# Patient Record
Sex: MALE | Race: WHITE | Employment: UNEMPLOYED | URBAN - METROPOLITAN AREA
[De-identification: names, ages, dates, MRNs, and addresses within clinical notes are randomized per-mention and may not be internally consistent; named-entity substitution may affect disease eponyms.]

---

## 2017-02-17 ENCOUNTER — HOSPITAL ENCOUNTER (OUTPATIENT)
Dept: GENERAL RADIOLOGY | Age: 57
Discharge: HOME | End: 2017-02-17
Attending: ORTHOPAEDIC SURGERY
Payer: OTHER MISCELLANEOUS

## 2017-02-17 ENCOUNTER — HOSPITAL ENCOUNTER (OUTPATIENT)
Dept: CT IMAGING | Age: 57
Discharge: HOME | End: 2017-02-17
Attending: ORTHOPAEDIC SURGERY
Payer: OTHER MISCELLANEOUS

## 2017-02-17 VITALS
RESPIRATION RATE: 16 BRPM | SYSTOLIC BLOOD PRESSURE: 158 MMHG | DIASTOLIC BLOOD PRESSURE: 112 MMHG | HEART RATE: 75 BPM | OXYGEN SATURATION: 95 %

## 2017-02-17 DIAGNOSIS — M48.02 SPINAL STENOSIS IN CERVICAL REGION: ICD-10-CM

## 2017-02-17 DIAGNOSIS — M43.02 SPONDYLOLYSIS OF CERVICAL REGION: ICD-10-CM

## 2017-02-17 PROCEDURE — 77030018868 HC TY MYELGRM BD -A

## 2017-02-17 PROCEDURE — 62302 MYELOGRAPHY LUMBAR INJECTION: CPT

## 2017-02-17 PROCEDURE — 77030003666 HC NDL SPINAL BD -A

## 2017-02-17 PROCEDURE — 72126 CT NECK SPINE W/DYE: CPT

## 2017-02-17 PROCEDURE — 74011636320 HC RX REV CODE- 636/320: Performed by: RADIOLOGY

## 2017-02-17 RX ORDER — LIDOCAINE HYDROCHLORIDE 10 MG/ML
10 INJECTION INFILTRATION; PERINEURAL
Status: DISPENSED | OUTPATIENT
Start: 2017-02-17 | End: 2017-02-17

## 2017-02-17 RX ADMIN — IOHEXOL 20 ML: 240 INJECTION, SOLUTION INTRATHECAL; INTRAVASCULAR; INTRAVENOUS; ORAL at 09:26

## 2017-02-17 NOTE — DISCHARGE INSTRUCTIONS
Myelogram: About This Test  What is it? A myelogram uses X-rays and a special dye to make pictures of bones and nerves of the spine (spinal canal). The spinal canal holds the spinal cord, the spinal nerve roots, and the fluid-filled space between the bones in your spine. Why is this test done? A myelogram is done to check for:  · The cause of arm or leg numbness, weakness, or pain. · Narrowing of the spinal canal (spinal stenosis). · A tumor or infection causing problems with the spinal cord or nerve roots. · A spinal disc that has ruptured (herniated disc). · Inflammation of the membrane that covers the brain and spinal cord. · Problems with the blood vessels to the spine. How can you prepare for the test?  Your doctor will tell you if you need to change how much you eat and drink before the myelogram. You may be asked to increase the amount of water you drink before the test. Follow the instructions your doctor gives you about eating and drinking. Before a myelogram, tell your doctor if:  · You are allergic to any medicines, contrast material, or iodine dye. · You have had bleeding problems, or you take a blood thinner, such as aspirin, clopidogrel (Plavix), or warfarin (Coumadin), or you take over-the-counter medicines, such as ibuprofen (Advil, Motrin) or naproxen (Aleve). Your doctor will tell you when you should stop taking these medicines several days before your procedure. Make sure that you understand exactly what he or she wants you to do. · You have had kidney problems. · You have diabetes, especially if you take metformin (Glucophage, for example). · You are or might be pregnant. Ask someone to take you home and stay with you after the test.  What happens during the test?  The dye is put into your spinal canal with a thin needle. This is called a lumbar puncture. The dye moves through the space so the nerve roots and spinal cord can be seen more clearly.  After the dye is put in, you will lie still while the X-ray pictures are taken. How does it feel? You will feel a quick sting from a small needle that has medicine to numb the skin on your back. You will also feel some pressure as the long, thin spinal needle is put into your spinal canal. You may feel a quick sharp pain down your buttock or leg when the needle is moved in your spine. The dye may make you feel warm and flushed and have a metallic taste in your mouth. What else should you know about the test?  In rare cases, the hole made by the needle in the sac around the spine does not close normally. This can allow spinal fluid to leak out. This leak may need to be repaired through a procedure called an epidural blood patch. To do the patch, your doctor injects some of your own blood to cover the hole. How long does the test take? · A myelogram usually takes 30 minutes to 1 hour. What happens after the test?  · You will probably be able to go home 30 minutes to 2 hours after the test.  · You may need to lie in bed with your head raised for 4 to 24 hours after the test. To prevent seizures, which are a rare side effect, do not bend over or lie down with your head lower than your body. Keeping your head higher than your body after a myelogram also may help prevent or reduce other side effects, such as headache, nausea, or vomiting. · Avoid strenuous activity, such as running or heavy lifting, for at least 1 day after your myelogram.  · Drink plenty of water after the myelogram. Your doctor will give you instructions on taking your regular medicines. When should you call for help? Call 911 anytime you think you may need emergency care. For example, call if:  · You have a seizure. Call your doctor now or seek immediate medical care if:  · You have any increase in pain, weakness, or numbness in your legs. · You have a severe headache or stiff neck, or your eyes become very sensitive to light.   · You have a headache that lasts longer than 24 hours. · You have problems urinating or having a bowel movement. · You have a fever. Follow-up care is a key part of your treatment and safety. Be sure to make and go to all appointments, and call your doctor if you are having problems. It's also a good idea to keep a list of the medicines you take. Ask your doctor when you can expect to have your test results. Where can you learn more? Go to http://charlie-nikhil.info/. Enter S035 in the search box to learn more about \"Myelogram: About This Test.\"  Current as of: August 15, 2016  Content Version: 11.1  © 4147-5594 [employer], [employer]. Care instructions adapted under license by [employer] (which disclaims liability or warranty for this information). If you have questions about a medical condition or this instruction, always ask your healthcare professional. [address] any warranty or liability for your use of this information.

## 2017-02-17 NOTE — PROGRESS NOTES
Pt rec'd to EEG Room/Rad holding post myelogram.  Bandaid intact to lower mid back with s/s drainage. Pt is alert and oriented x3 and c/o 6/10 neck pain which he says is ongoing and chronic. He states he takes ibuprofen for pain with good effect at home. Pt's BP noted to be elevated and pt states it is always high when he is in the hospital or doctors office. Reviewed discharge instructions with patient and wife, printed copy given to them. Pt given disk of procedure. Discharged ambulatory to vehicle to care of wife.

## 2017-02-17 NOTE — IP AVS SNAPSHOT
[facility] 
 
 
 [address] [place] [address] 
[phone] Patient: Chris Melton MRN: [id number] You are allergic to the following Allergen Reactions Prednisone Other (comments) About your hospitalization You were admitted on:  February 17, 2017 You last received care in the:  [facility] RADIOLOGY You were discharged on:  February 17, 2017 Unit phone number:  [phone] Why you were hospitalized Your primary diagnosis was:  Not on File Providers Seen During Your Hospitalizations Provider Role Specialty Primary office phone Brayan Medel MD Attending Provider Orthopedic Surgery [phone] Your Primary Care Physician (PCP) Primary Care Physician Office Phone Office Fax Shade Duverney [phone] Follow-up Information None Your Appointments Friday February 17, 2017  9:30 AM EST  
CT SPINE CERV W CONT with [facility] CT 2 SFM RAD CT ([address]) [facility] [place] [address]  
[phone] CONTRAST STUDY: 1. The patient should not eat solid food four hours before the appointment but should be encouraged to drink clear liquids. 2. The patient will require IV access for contrast administration. 3. The patient should not take  Ibuprofen (Advil, Motrin, etc.) and Naproxen Sodium (Aleve, etc.)  on the day of the exam. Stopping non-steroidal anti-inflammatory agents (NSAIDs) like Ibuprofen decreases the risk of kidney damage from the x-ray contrast (dye). 4. Bring any non [facility] facility films/images pertaining to the area of interest with you on the day of appointment. 5. Bring current lab work if available(within last 90 days [facility]) ***If scheduled at [facility] is not available, labs will need to be done before appointment*** 6.  Check in at registration at least 30 minutes before appt time unless you were instructed to do otherwise. 7. If you have to drink oral contrast please pick it up any weekday prior to your appointment, if you cannot please check in 2 hrs  before appt time. Park in designated visitor/patient parking. Enter through the main entrance, which is just to the left of the fountain. Once inside, go around the corner to the left. You will register in Outpatient Registration. Current Discharge Medication List  
  
Notice You have not been prescribed any medications. Discharge Instructions Myelogram: About This Test 
What is it? A myelogram uses X-rays and a special dye to make pictures of bones and nerves of the spine (spinal canal). The spinal canal holds the spinal cord, the spinal nerve roots, and the fluid-filled space between the bones in your spine. Why is this test done? A myelogram is done to check for: · The cause of arm or leg numbness, weakness, or pain. · Narrowing of the spinal canal (spinal stenosis). · A tumor or infection causing problems with the spinal cord or nerve roots. · A spinal disc that has ruptured (herniated disc). · Inflammation of the membrane that covers the brain and spinal cord. · Problems with the blood vessels to the spine. How can you prepare for the test? 
Your doctor will tell you if you need to change how much you eat and drink before the myelogram. You may be asked to increase the amount of water you drink before the test. Follow the instructions your doctor gives you about eating and drinking. Before a myelogram, tell your doctor if: 
· You are allergic to any medicines, contrast material, or iodine dye.  
· You have had bleeding problems, or you take a blood thinner, such as aspirin, clopidogrel (Plavix), or warfarin (Coumadin), or you take over-the-counter medicines, such as ibuprofen (Advil, Motrin) or naproxen (Aleve). Your doctor will tell you when you should stop taking these medicines several days before your procedure. Make sure that you understand exactly what he or she wants you to do. · You have had kidney problems. · You have diabetes, especially if you take metformin (Glucophage, for example). · You are or might be pregnant. Ask someone to take you home and stay with you after the test. 
What happens during the test? 
The dye is put into your spinal canal with a thin needle. This is called a lumbar puncture. The dye moves through the space so the nerve roots and spinal cord can be seen more clearly. After the dye is put in, you will lie still while the X-ray pictures are taken. How does it feel? You will feel a quick sting from a small needle that has medicine to numb the skin on your back. You will also feel some pressure as the long, thin spinal needle is put into your spinal canal. You may feel a quick sharp pain down your buttock or leg when the needle is moved in your spine. The dye may make you feel warm and flushed and have a metallic taste in your mouth. What else should you know about the test? 
In rare cases, the hole made by the needle in the sac around the spine does not close normally. This can allow spinal fluid to leak out. This leak may need to be repaired through a procedure called an epidural blood patch. To do the patch, your doctor injects some of your own blood to cover the hole. How long does the test take? · A myelogram usually takes 30 minutes to 1 hour. What happens after the test? 
· You will probably be able to go home 30 minutes to 2 hours after the test. 
· You may need to lie in bed with your head raised for 4 to 24 hours after the test. To prevent seizures, which are a rare side effect, do not bend over or lie down with your head lower than your body.  Keeping your head higher than your body after a myelogram also may help prevent or reduce other side effects, such as headache, nausea, or vomiting. · Avoid strenuous activity, such as running or heavy lifting, for at least 1 day after your myelogram. 
· Drink plenty of water after the myelogram. Your doctor will give you instructions on taking your regular medicines. When should you call for help? Call 911 anytime you think you may need emergency care. For example, call if: 
· You have a seizure. Call your doctor now or seek immediate medical care if: 
· You have any increase in pain, weakness, or numbness in your legs. · You have a severe headache or stiff neck, or your eyes become very sensitive to light. · You have a headache that lasts longer than 24 hours. · You have problems urinating or having a bowel movement. · You have a fever. Follow-up care is a key part of your treatment and safety. Be sure to make and go to all appointments, and call your doctor if you are having problems. It's also a good idea to keep a list of the medicines you take. Ask your doctor when you can expect to have your test results. Where can you learn more? Go to http://charlie-nikhil.info/. Enter K244 in the search box to learn more about \"Myelogram: About This Test.\" Current as of: August 15, 2016 Content Version: 11.1 © 5990-5052 [employer]. Care instructions adapted under license by [employer] (which disclaims liability or warranty for this information). If you have questions about a medical condition or this instruction, always ask your healthcare professional. [place] [address] any warranty or liability for your use of this information. Discharge Orders None Introducing [facility]! Jo Ann Whipple introduces Plug.dj patient portal. Now you can access parts of your medical record, email your doctor's office, and request medication refills online. 1. In your internet browser, go to [email or web address] 2. Click on the First Time User? Click Here link in the Sign In box. You will see the New Member Sign Up page. 3. Enter your [employer] Access Code exactly as it appears below. You will not need to use this code after youve completed the sign-up process. If you do not sign up before the expiration date, you must request a new code. · [employer] Access Code: [email or web address] Expires: 5/11/2017  1:31 PM 
 
4. Enter the last four digits of your Social Security Number (xxxx) and Date of Birth (mm/dd/yyyy) as indicated and click Submit. You will be taken to the next sign-up page. 5. Create a D[employer] ID. This will be your [employer] login ID and cannot be changed, so think of one that is secure and easy to remember. 6. Create a [employer] password. You can change your password at any time. 7. Enter your Password Reset Question and Answer. This can be used at a later time if you forget your password. 8. Enter your e-mail address. You will receive e-mail notification when new information is available in [address] 9. Click Sign Up. You can now view and download portions of your medical record. 10. Click the Download Summary menu link to download a portable copy of your medical information. If you have questions, please visit the Frequently Asked Questions section of the [employer] website. Remember, [employer] is NOT to be used for urgent needs. For medical emergencies, dial 911. Now available from your iPhone and Android! General Information Please provide this summary of care documentation to your next provider. Patient Signature:  ____________________________________________________________ Date:  ____________________________________________________________  
  
Stuart Mais Provider Signature:  ____________________________________________________________ Date:  ____________________________________________________________

## 2017-02-21 ENCOUNTER — HOSPITAL ENCOUNTER (EMERGENCY)
Age: 57
Discharge: HOME | End: 2017-02-21
Attending: EMERGENCY MEDICINE
Payer: COMMERCIAL

## 2017-02-21 ENCOUNTER — APPOINTMENT (OUTPATIENT)
Dept: CT IMAGING | Age: 57
End: 2017-02-21
Attending: NURSE PRACTITIONER
Payer: COMMERCIAL

## 2017-02-21 VITALS
HEART RATE: 74 BPM | DIASTOLIC BLOOD PRESSURE: 108 MMHG | HEIGHT: 67 IN | WEIGHT: 160 LBS | BODY MASS INDEX: 25.11 KG/M2 | OXYGEN SATURATION: 97 % | RESPIRATION RATE: 20 BRPM | TEMPERATURE: 97.9 F | SYSTOLIC BLOOD PRESSURE: 178 MMHG

## 2017-02-21 DIAGNOSIS — F10.921 ALCOHOL INTOXICATION, WITH DELIRIUM (HCC): Primary | ICD-10-CM

## 2017-02-21 DIAGNOSIS — M54.2 CHRONIC NECK PAIN: ICD-10-CM

## 2017-02-21 DIAGNOSIS — R40.4 TRANSIENT ALTERATION OF AWARENESS: ICD-10-CM

## 2017-02-21 DIAGNOSIS — G89.29 CHRONIC NECK PAIN: ICD-10-CM

## 2017-02-21 LAB
ALBUMIN SERPL BCP-MCNC: 4 G/DL (ref 3.5–5)
ALBUMIN/GLOB SERPL: 1.1 {RATIO} (ref 1.1–2.2)
ALP SERPL-CCNC: 87 U/L (ref 45–117)
ALT SERPL-CCNC: 154 U/L (ref 12–78)
AMPHET UR QL SCN: NEGATIVE
ANION GAP BLD CALC-SCNC: 18 MMOL/L (ref 5–15)
APAP SERPL-MCNC: <2 UG/ML (ref 10–30)
APPEARANCE UR: ABNORMAL
AST SERPL W P-5'-P-CCNC: 121 U/L (ref 15–37)
BACTERIA URNS QL MICRO: NEGATIVE /HPF
BARBITURATES UR QL SCN: NEGATIVE
BASOPHILS # BLD AUTO: 0 K/UL (ref 0–0.1)
BASOPHILS # BLD: 0 % (ref 0–1)
BENZODIAZ UR QL: NEGATIVE
BILIRUB SERPL-MCNC: 0.7 MG/DL (ref 0.2–1)
BILIRUB UR QL CFM: NEGATIVE
BUN SERPL-MCNC: 16 MG/DL (ref 6–20)
BUN/CREAT SERPL: 19 (ref 12–20)
CALCIUM SERPL-MCNC: 8.8 MG/DL (ref 8.5–10.1)
CANNABINOIDS UR QL SCN: NEGATIVE
CHLORIDE SERPL-SCNC: 102 MMOL/L (ref 97–108)
CO2 SERPL-SCNC: 21 MMOL/L (ref 21–32)
COCAINE UR QL SCN: NEGATIVE
COLOR UR: ABNORMAL
CREAT SERPL-MCNC: 0.84 MG/DL (ref 0.7–1.3)
DRUG SCRN COMMENT,DRGCM: NORMAL
EOSINOPHIL # BLD: 0 K/UL (ref 0–0.4)
EOSINOPHIL NFR BLD: 0 % (ref 0–7)
EPITH CASTS URNS QL MICRO: ABNORMAL /LPF
ERYTHROCYTE [DISTWIDTH] IN BLOOD BY AUTOMATED COUNT: 13 % (ref 11.5–14.5)
ETHANOL SERPL-MCNC: 235 MG/DL
GLOBULIN SER CALC-MCNC: 3.7 G/DL (ref 2–4)
GLUCOSE SERPL-MCNC: 79 MG/DL (ref 65–100)
GLUCOSE UR STRIP.AUTO-MCNC: NEGATIVE MG/DL
HCT VFR BLD AUTO: 47.3 % (ref 36.6–50.3)
HGB BLD-MCNC: 17 G/DL (ref 12.1–17)
HGB UR QL STRIP: NEGATIVE
HYALINE CASTS URNS QL MICRO: ABNORMAL /LPF (ref 0–5)
KETONES UR QL STRIP.AUTO: 40 MG/DL
LEUKOCYTE ESTERASE UR QL STRIP.AUTO: NEGATIVE
LYMPHOCYTES # BLD AUTO: 24 % (ref 12–49)
LYMPHOCYTES # BLD: 1.3 K/UL (ref 0.8–3.5)
MCH RBC QN AUTO: 36.4 PG (ref 26–34)
MCHC RBC AUTO-ENTMCNC: 35.9 G/DL (ref 30–36.5)
MCV RBC AUTO: 101.3 FL (ref 80–99)
METHADONE UR QL: NEGATIVE
MONOCYTES # BLD: 0.5 K/UL (ref 0–1)
MONOCYTES NFR BLD AUTO: 8 % (ref 5–13)
NEUTS SEG # BLD: 3.7 K/UL (ref 1.8–8)
NEUTS SEG NFR BLD AUTO: 68 % (ref 32–75)
NITRITE UR QL STRIP.AUTO: NEGATIVE
OPIATES UR QL: NEGATIVE
PCP UR QL: NEGATIVE
PH UR STRIP: 6 [PH] (ref 5–8)
PLATELET # BLD AUTO: 155 K/UL (ref 150–400)
POTASSIUM SERPL-SCNC: 3.9 MMOL/L (ref 3.5–5.1)
PROT SERPL-MCNC: 7.7 G/DL (ref 6.4–8.2)
PROT UR STRIP-MCNC: 100 MG/DL
RBC # BLD AUTO: 4.67 M/UL (ref 4.1–5.7)
RBC #/AREA URNS HPF: ABNORMAL /HPF (ref 0–5)
SALICYLATES SERPL-MCNC: 6.8 MG/DL (ref 2.8–20)
SODIUM SERPL-SCNC: 141 MMOL/L (ref 136–145)
SP GR UR REFRACTOMETRY: 1.03 (ref 1–1.03)
TSH SERPL DL<=0.05 MIU/L-ACNC: 1.79 UIU/ML (ref 0.36–3.74)
UA: UC IF INDICATED,UAUC: ABNORMAL
UROBILINOGEN UR QL STRIP.AUTO: 0.2 EU/DL (ref 0.2–1)
WBC # BLD AUTO: 5.5 K/UL (ref 4.1–11.1)
WBC URNS QL MICRO: ABNORMAL /HPF (ref 0–4)

## 2017-02-21 PROCEDURE — 80307 DRUG TEST PRSMV CHEM ANLYZR: CPT | Performed by: NURSE PRACTITIONER

## 2017-02-21 PROCEDURE — 84443 ASSAY THYROID STIM HORMONE: CPT | Performed by: NURSE PRACTITIONER

## 2017-02-21 PROCEDURE — 99284 EMERGENCY DEPT VISIT MOD MDM: CPT

## 2017-02-21 PROCEDURE — 81001 URINALYSIS AUTO W/SCOPE: CPT | Performed by: NURSE PRACTITIONER

## 2017-02-21 PROCEDURE — 36415 COLL VENOUS BLD VENIPUNCTURE: CPT | Performed by: NURSE PRACTITIONER

## 2017-02-21 PROCEDURE — 70450 CT HEAD/BRAIN W/O DYE: CPT

## 2017-02-21 PROCEDURE — 85025 COMPLETE CBC W/AUTO DIFF WBC: CPT | Performed by: NURSE PRACTITIONER

## 2017-02-21 PROCEDURE — 74011250637 HC RX REV CODE- 250/637: Performed by: NURSE PRACTITIONER

## 2017-02-21 PROCEDURE — 80053 COMPREHEN METABOLIC PANEL: CPT | Performed by: NURSE PRACTITIONER

## 2017-02-21 RX ORDER — OXYCODONE AND ACETAMINOPHEN 5; 325 MG/1; MG/1
2 TABLET ORAL
Status: COMPLETED | OUTPATIENT
Start: 2017-02-21 | End: 2017-02-21

## 2017-02-21 RX ADMIN — OXYCODONE HYDROCHLORIDE AND ACETAMINOPHEN 2 TABLET: 5; 325 TABLET ORAL at 22:55

## 2017-02-22 NOTE — ED PROVIDER NOTES
HPI Comments: Vera Man is a 64 y.o. male with Hx of chronic neck pain and tobacco abuse, HTN who presents ambulatory with his family/friends to [facility] ED with cc of AMS. Per friend, the family is concerned because the patient was talking about going to work tonight and stating unusual things than his baseline. They report concern for AMS and brought pt to the ED. Pt states that \"I would like to just go home and go to bed\" on assessment. He reports neck pain, states this as chronic. Pt had a myelogram on Friday to assist with pain management. Pt reports that he takes Motrin for pain only and does not take Percocet, although those are prescribed to him. He denies taking any Percocet and reports taking Motrin earlier today. No reported psychiatric history or admissions. No hallucinations, SI/HI/ delusion. He daily reports tobacco abuse and states that he does drink ETOH, however he reports his last drink as 2 days ago. No reported illicit drug abuse. Pt able to ambulate without difficulty and speak without difficulty per family. Pt does not believe he is altered at this time. No reported history of HA, F/C, N/V/D, cough, congestion, CP, SOB, dysuria, urinary frequency/hesitancy, flank pain. PCP: Bob Naranjo MD    There are no other complaints, changes or physical findings at this time. The history is provided by the patient, the spouse and a friend. Past Medical History:   Diagnosis Date    Crohn's disease [facility])        Past Surgical History:   Procedure Laterality Date    Hx cervical fusion      Hx rotator cuff repair      Pr pelvis/hip joint surgery unlisted           No family history on file. Social History     Social History    Marital status:      Spouse name: N/A    Number of children: N/A    Years of education: N/A     Occupational History    Not on file.      Social History Main Topics    Smoking status: Not on file    Smokeless tobacco: Not on file    Alcohol use Not on file    Drug use: Not on file    Sexual activity: Not on file     Other Topics Concern    Not on file     Social History Narrative    No narrative on file         ALLERGIES: Prednisone    Review of Systems   Constitutional: Negative for activity change, appetite change, chills and fever. HENT: Negative for congestion, rhinorrhea, sinus pressure, sneezing and sore throat. Eyes: Negative for pain, discharge and visual disturbance. Respiratory: Negative for cough and shortness of breath. Cardiovascular: Negative for chest pain. Gastrointestinal: Negative for abdominal pain, diarrhea, nausea and vomiting. Genitourinary: Negative for dysuria, flank pain, frequency and urgency. Musculoskeletal: Positive for neck pain. Negative for arthralgias, back pain, gait problem, joint swelling and myalgias. Skin: Negative for color change and rash. Neurological: Negative for dizziness, speech difficulty, weakness, light-headedness, numbness and headaches. Psychiatric/Behavioral: Positive for agitation and confusion. Negative for behavioral problems. All other systems reviewed and are negative. Vitals:    02/21/17 2154 02/21/17 2306   BP: (!) 193/110 (!) 178/108   Pulse: 74    Resp: 20    Temp: 97.9 °F (36.6 °C)    SpO2: 98% 97%   Weight: 72.6 kg (160 lb)    Height: 5' 7\" (1.702 m)             Physical Exam   Constitutional: He is oriented to person, place, and time. He appears well-developed and well-nourished. No distress. Smells heavily of ETOH and tobacco    HENT:   Head: Normocephalic and atraumatic. Right Ear: External ear normal.   Left Ear: External ear normal.   Nose: Nose normal.   Mouth/Throat: Oropharynx is clear and moist. No oropharyngeal exudate. Eyes: Conjunctivae and EOM are normal. Pupils are equal, round, and reactive to light. Neck: Normal range of motion. Neck supple. Cardiovascular: Normal rate, regular rhythm, normal heart sounds and intact distal pulses. Pulmonary/Chest: Effort normal and breath sounds normal.   Abdominal: Soft. Musculoskeletal: Normal range of motion. Neurological: He is alert and oriented to person, place, and time. No cranial nerve deficit. Coordination normal.   Skin: Skin is warm and dry. Psychiatric: Judgment and thought content normal. His mood appears anxious. His affect is angry. His speech is rapid and/or pressured. He is agitated and withdrawn. Cognition and memory are normal.   Nursing note and vitals reviewed. MDM  Number of Diagnoses or Management Options  Alcohol intoxication, with delirium [facility]):   Chronic neck pain:   Elevated BP:   Transient alteration of awareness:   Diagnosis management comments: Ddx: CVA, ETOH intoxication, dehydration, UTI, pain exacerbation, anxiety     65 yo M presents with family 2/2 concern for episode of AMS. No acute findings except ETOH level with elevated LFTs. I have high suspicion for ETOH abuse. He does not have opiates in his urine, likely not taking his Percocet for pain management. Possibly self medicating. Pt was A&Ox4 and agitated/ wanting to leave ED. Advised wife to f/u with PCP for ongoing concerns for ETOH. Agrees with d/c.         Amount and/or Complexity of Data Reviewed  Clinical lab tests: ordered and reviewed  Tests in the radiology section of CPT®: ordered and reviewed  Review and summarize past medical records: yes  Discuss the patient with other providers: yes      ED Course       Procedures    LABORATORY TESTS:  Recent Results (from the past 12 hour(s))   CBC WITH AUTOMATED DIFF    Collection Time: 02/21/17 10:32 PM   Result Value Ref Range    WBC 5.5 4.1 - 11.1 K/uL    RBC 4.67 4.10 - 5.70 M/uL    HGB 17.0 12.1 - 17.0 g/dL    HCT 47.3 36.6 - 50.3 %    .3 (H) 80.0 - 99.0 FL    MCH 36.4 (H) 26.0 - 34.0 PG    MCHC 35.9 30.0 - 36.5 g/dL    RDW 13.0 11.5 - 14.5 %    PLATELET 316 200 - 001 K/uL    NEUTROPHILS 68 32 - 75 %    LYMPHOCYTES 24 12 - 49 %    MONOCYTES 8 5 - 13 %    EOSINOPHILS 0 0 - 7 %    BASOPHILS 0 0 - 1 %    ABS. NEUTROPHILS 3.7 1.8 - 8.0 K/UL    ABS. LYMPHOCYTES 1.3 0.8 - 3.5 K/UL    ABS. MONOCYTES 0.5 0.0 - 1.0 K/UL    ABS. EOSINOPHILS 0.0 0.0 - 0.4 K/UL    ABS. BASOPHILS 0.0 0.0 - 0.1 K/UL   METABOLIC PANEL, COMPREHENSIVE    Collection Time: 02/21/17 10:32 PM   Result Value Ref Range    Sodium 141 136 - 145 mmol/L    Potassium 3.9 3.5 - 5.1 mmol/L    Chloride 102 97 - 108 mmol/L    CO2 21 21 - 32 mmol/L    Anion gap 18 (H) 5 - 15 mmol/L    Glucose 79 65 - 100 mg/dL    BUN 16 6 - 20 MG/DL    Creatinine 0.84 0.70 - 1.30 MG/DL    BUN/Creatinine ratio 19 12 - 20      GFR est AA >60 >60 ml/min/1.73m2    GFR est non-AA >60 >60 ml/min/1.73m2    Calcium 8.8 8.5 - 10.1 MG/DL    Bilirubin, total 0.7 0.2 - 1.0 MG/DL    ALT (SGPT) 154 (H) 12 - 78 U/L    AST (SGOT) 121 (H) 15 - 37 U/L    Alk.  phosphatase 87 45 - 117 U/L    Protein, total 7.7 6.4 - 8.2 g/dL    Albumin 4.0 3.5 - 5.0 g/dL    Globulin 3.7 2.0 - 4.0 g/dL    A-G Ratio 1.1 1.1 - 2.2     TSH 3RD GENERATION    Collection Time: 02/21/17 10:32 PM   Result Value Ref Range    TSH 1.79 0.36 - 3.74 uIU/mL   ETHYL ALCOHOL    Collection Time: 02/21/17 10:32 PM   Result Value Ref Range    ALCOHOL(ETHYL),SERUM 235 (H) <10 MG/DL   ACETAMINOPHEN    Collection Time: 02/21/17 10:32 PM   Result Value Ref Range    ACETAMINOPHEN <2 (L) 10 - 30 ug/mL   SALICYLATE    Collection Time: 02/21/17 10:32 PM   Result Value Ref Range    SALICYLATE 6.8 2.8 - 12.7 MG/DL   DRUG SCREEN, URINE    Collection Time: 02/21/17 10:38 PM   Result Value Ref Range    AMPHETAMINE NEGATIVE  NEG      BARBITURATES NEGATIVE  NEG      BENZODIAZEPINE NEGATIVE  NEG      COCAINE NEGATIVE  NEG      METHADONE NEGATIVE  NEG      OPIATES NEGATIVE  NEG      PCP(PHENCYCLIDINE) NEGATIVE  NEG      THC (TH-CANNABINOL) NEGATIVE  NEG      Drug screen comment (NOTE)    URINALYSIS W/ REFLEX CULTURE    Collection Time: 02/21/17 10:38 PM   Result Value Ref Range    Color DARK YELLOW      Appearance TURBID (A) CLEAR      Specific gravity 1.027 1.003 - 1.030      pH (UA) 6.0 5.0 - 8.0      Protein 100 (A) NEG mg/dL    Glucose NEGATIVE  NEG mg/dL    Ketone 40 (A) NEG mg/dL    Blood NEGATIVE  NEG      Urobilinogen 0.2 0.2 - 1.0 EU/dL    Nitrites NEGATIVE  NEG      Leukocyte Esterase NEGATIVE  NEG      WBC 0-4 0 - 4 /hpf    RBC 0-5 0 - 5 /hpf    Epithelial cells FEW FEW /lpf    Bacteria NEGATIVE  NEG /hpf    UA:UC IF INDICATED CULTURE NOT INDICATED BY UA RESULT CNI      Hyaline cast 0-2 0 - 5 /lpf   BILIRUBIN, CONFIRM    Collection Time: 02/21/17 10:38 PM   Result Value Ref Range    Bilirubin UA, confirm NEGATIVE  NEG         IMAGING RESULTS:  CT HEAD WO CONT   Final Result      EXAM: CT HEAD WO CONT     INDICATION: Confusion/delirium, altered LOC, unexplained     COMPARISON: None.     TECHNIQUE: Unenhanced CT of the head was performed using 5 mm images. Brain and  bone windows were generated. CT dose reduction was achieved through use of a  standardized protocol tailored for this examination and automatic exposure  control for dose modulation.      FINDINGS:  There is no extra-axial fluid collection hemorrhage shift or masses her.     IMPRESSION  IMPRESSION: No acute changes. MEDICATIONS GIVEN:  Medications   oxyCODONE-acetaminophen (PERCOCET) 5-325 mg per tablet 2 Tab (2 Tabs Oral Given 2/21/17 9676)       IMPRESSION:  1. Alcohol intoxication, with delirium ([address]    2. Transient alteration of awareness    3. Chronic neck pain    4. Elevated BP        PLAN:  1. There are no discharge medications for this patient.     2.   Follow-up Information     Follow up With Details Comments Contact Info    Iraida Galeano MD Schedule an appointment as soon as possible for a visit if you need help regarding your ETOH use  [address] [id number] [phone]      [facility] EMERGENCY DEPT Go to As needed, If symptoms worsen [address]  [phone] 3. Return to ED if worse     Discharge Note:    The patient is ready for discharge. The patient's signs, symptoms, diagnosis, and discharge instruction have been discussed and the patient has conveyed their understanding. The patient is to follow up as recommended or return to the ER should their symptoms worsen. Plan has been discussed and the patient is in agreement.     Stacey Roy, LUDIN

## 2017-02-22 NOTE — DISCHARGE INSTRUCTIONS
We hope that we have addressed all of your medical concerns. The examination and treatment you received in the Emergency Department were for an emergent problem and were not intended as complete care. It is important that you follow up with your healthcare provider(s) for ongoing care. If your symptoms worsen or do not improve as expected, and you are unable to reach your usual health care provider(s), you should return to the Emergency Department. Today's healthcare is undergoing tremendous change, and patient satisfaction surveys are one of the many tools to assess the quality of medical care. You may receive a survey from the [employer] regarding your experience in the Emergency Department. I hope that your experience has been completely positive, particularly the medical care that I provided. As such, please participate in the survey; anything less than excellent does not meet my expectations or intentions. [facility] and [address] [place] [address] participate in nationally recognized quality of care measures. If your blood pressure is greater than 120/80, as reported below, we urge that you seek medical care to address the potential of high blood pressure, commonly known as hypertension. Hypertension can be hereditary or can be caused by certain medical conditions, pain, stress, or \"white coat syndrome. \"       Please make an appointment with your health care provider(s) for follow up of your Emergency Department visit. VITALS:   Patient Vitals for the past 8 hrs:   Temp Pulse Resp BP SpO2   02/21/17 2306 - - - (!) 178/108 97 %   02/21/17 2154 97.9 °F (36.6 °C) 74 20 (!) 193/110 98 %          Thank you for allowing us to provide you with medical care today. We realize that you have many choices for your emergency care needs. Please choose us in the future for any continued health care needs. Uche Sutton, NP    [address] [facility].   Office: [phone]            Recent Results (from the past 24 hour(s))   CBC WITH AUTOMATED DIFF    Collection Time: 02/21/17 10:32 PM   Result Value Ref Range    WBC 5.5 4.1 - 11.1 K/uL    RBC 4.67 4.10 - 5.70 M/uL    HGB 17.0 12.1 - 17.0 g/dL    HCT 47.3 36.6 - 50.3 %    .3 (H) 80.0 - 99.0 FL    MCH 36.4 (H) 26.0 - 34.0 PG    MCHC 35.9 30.0 - 36.5 g/dL    RDW 13.0 11.5 - 14.5 %    PLATELET 838 996 - 819 K/uL    NEUTROPHILS 68 32 - 75 %    LYMPHOCYTES 24 12 - 49 %    MONOCYTES 8 5 - 13 %    EOSINOPHILS 0 0 - 7 %    BASOPHILS 0 0 - 1 %    ABS. NEUTROPHILS 3.7 1.8 - 8.0 K/UL    ABS. LYMPHOCYTES 1.3 0.8 - 3.5 K/UL    ABS. MONOCYTES 0.5 0.0 - 1.0 K/UL    ABS. EOSINOPHILS 0.0 0.0 - 0.4 K/UL    ABS. BASOPHILS 0.0 0.0 - 0.1 K/UL   METABOLIC PANEL, COMPREHENSIVE    Collection Time: 02/21/17 10:32 PM   Result Value Ref Range    Sodium 141 136 - 145 mmol/L    Potassium 3.9 3.5 - 5.1 mmol/L    Chloride 102 97 - 108 mmol/L    CO2 21 21 - 32 mmol/L    Anion gap 18 (H) 5 - 15 mmol/L    Glucose 79 65 - 100 mg/dL    BUN 16 6 - 20 MG/DL    Creatinine 0.84 0.70 - 1.30 MG/DL    BUN/Creatinine ratio 19 12 - 20      GFR est AA >60 >60 ml/min/1.73m2    GFR est non-AA >60 >60 ml/min/1.73m2    Calcium 8.8 8.5 - 10.1 MG/DL    Bilirubin, total 0.7 0.2 - 1.0 MG/DL    ALT (SGPT) 154 (H) 12 - 78 U/L    AST (SGOT) 121 (H) 15 - 37 U/L    Alk.  phosphatase 87 45 - 117 U/L    Protein, total 7.7 6.4 - 8.2 g/dL    Albumin 4.0 3.5 - 5.0 g/dL    Globulin 3.7 2.0 - 4.0 g/dL    A-G Ratio 1.1 1.1 - 2.2     TSH 3RD GENERATION    Collection Time: 02/21/17 10:32 PM   Result Value Ref Range    TSH 1.79 0.36 - 3.74 uIU/mL   ETHYL ALCOHOL    Collection Time: 02/21/17 10:32 PM   Result Value Ref Range    ALCOHOL(ETHYL),SERUM 235 (H) <10 MG/DL   ACETAMINOPHEN    Collection Time: 02/21/17 10:32 PM   Result Value Ref Range    ACETAMINOPHEN <2 (L) 10 - 30 ug/mL   SALICYLATE    Collection Time: 02/21/17 10:32 PM Result Value Ref Range    SALICYLATE 6.8 2.8 - 89.0 MG/DL   URINALYSIS W/ REFLEX CULTURE    Collection Time: 02/21/17 10:38 PM   Result Value Ref Range    Color DARK YELLOW      Appearance TURBID (A) CLEAR      Specific gravity 1.027 1.003 - 1.030      pH (UA) 6.0 5.0 - 8.0      Protein 100 (A) NEG mg/dL    Glucose NEGATIVE  NEG mg/dL    Ketone 40 (A) NEG mg/dL    Blood NEGATIVE  NEG      Urobilinogen 0.2 0.2 - 1.0 EU/dL    Nitrites NEGATIVE  NEG      Leukocyte Esterase NEGATIVE  NEG      WBC 0-4 0 - 4 /hpf    RBC 0-5 0 - 5 /hpf    Epithelial cells FEW FEW /lpf    Bacteria NEGATIVE  NEG /hpf    UA:UC IF INDICATED CULTURE NOT INDICATED BY UA RESULT CNI      Hyaline cast 0-2 0 - 5 /lpf   BILIRUBIN, CONFIRM    Collection Time: 02/21/17 10:38 PM   Result Value Ref Range    Bilirubin UA, confirm NEGATIVE  NEG         Ct Head Wo Cont    Result Date: 2/21/2017  EXAM:  CT HEAD WO CONT INDICATION:   Confusion/delirium, altered LOC, unexplained COMPARISON: None. TECHNIQUE: Unenhanced CT of the head was performed using 5 mm images. Brain and bone windows were generated. CT dose reduction was achieved through use of a standardized protocol tailored for this examination and automatic exposure control for dose modulation. FINDINGS: There is no extra-axial fluid collection hemorrhage shift or masses her. IMPRESSION: No acute changes. Acute Alcohol Intoxication: Care Instructions  Your Care Instructions  You have had treatment to help your body rid itself of alcohol. Too much alcohol upsets the body's fluid balance. Your doctor may have given you fluids and vitamins. For some people, drinking too much alcohol is a one-time event. For others, it is an ongoing problem. In either case, it is serious. It can be life-threatening. Follow-up care is a key part of your treatment and safety. Be sure to make and go to all appointments, and call your doctor if you are having problems.  It's also a good idea to know your test results and keep a list of the medicines you take. How can you care for yourself at home? · Be safe with medicines. Take your medicines exactly as prescribed. Call your doctor if you think you are having a problem with your medicine. · Your doctor may have prescribed disulfiram (Antabuse). Do not drink any alcohol while you are taking this medicine. You may have severe or even life-threatening side effects from even small amounts of alcohol. · If you were given medicine to prevent nausea, be sure to take it exactly as prescribed. · Before you take any medicine, tell your doctor if:  ¨ You have had a bad reaction to any medicines in the past.  ¨ You are taking other medicines, including over-the-counter ones, or have other health problems. ¨ You are or could be pregnant. · Be prepared to have some symptoms of withdrawal in the next few days. · Drink plenty of liquids in the next few days. · Seek help if you need it to stop drinking. Getting counseling and joining a support group can help you stay sober. Try a support group such as Alcoholics Anonymous. · Avoid alcohol when you take medicines. It can react with many medicines and cause serious problems. When should you call for help? Call 911 anytime you think you may need emergency care. For example, call if:  · You feel confused and are seeing things that are not there. · You are thinking about killing yourself or hurting others. · You have a seizure. · You vomit blood or what looks like coffee grounds. Call your doctor now or seek immediate medical care if:  · You have trembling, restlessness, sweating, and other withdrawal symptoms that are new or that get worse. · Your withdrawal symptoms come back after not bothering you for days or weeks. · You can't stop vomiting. Watch closely for changes in your health, and be sure to contact your doctor if:  · You need help to stop drinking. Where can you learn more?   Go to http://charlie-nikhil.info/. Enter T102 in the search box to learn more about \"Acute Alcohol Intoxication: Care Instructions. \"  Current as of: May 27, 2016  Content Version: 11.1  © 5802-7664 [employer]. Care instructions adapted under license by [employer] (which disclaims liability or warranty for this information). If you have questions about a medical condition or this instruction, always ask your healthcare professional. [place] [address] any warranty or liability for your use of this information. Chronic Pain: Care Instructions  Your Care Instructions  Chronic pain is pain that lasts a long time (months or even years) and may or may not have a clear cause. It is different from acute pain, which usually does have a clear cause--like an injury or illness--and gets better over time. Chronic pain:  · Lasts over time but may vary from day to day. · Does not go away despite efforts to end it. · May disrupt your sleep and lead to fatigue. · May cause depression or anxiety. · May make your muscles tense, causing more pain. · Can disrupt your work, hobbies, home life, and relationships with friends and family. Chronic pain is a very real condition. It is not just in your head. Treatment can help and usually includes several methods used together, such as medicines, physical therapy, exercise, and other treatments. Learning how to relax and changing negative thought patterns can also help you cope. Chronic pain is complex. Taking an active role in your treatment will help you better manage your pain. Tell your doctor if you have trouble dealing with your pain. You may have to try several things before you find what works best for you. Follow-up care is a key part of your treatment and safety. Be sure to make and go to all appointments, and call your doctor if you are having problems.  Its also a good idea to know your test results and keep a list of the medicines you take. How can you care for yourself at home? · Pace yourself. Break up large jobs into smaller tasks. Save harder tasks for days when you have less pain, or go back and forth between hard tasks and easier ones. Take rest breaks. · Relax, and reduce stress. Relaxation techniques such as deep breathing or meditation can help. · Keep moving. Gentle, daily exercise can help reduce pain over the long run. Try low- or no-impact exercises such as walking, swimming, and stationary biking. Do stretches to stay flexible. · Try heat, cold packs, and massage. · Get enough sleep. Chronic pain can make you tired and drain your energy. Talk with your doctor if you have trouble sleeping because of pain. · Think positive. Your thoughts can affect your pain level. Do things that you enjoy to distract yourself when you have pain instead of focusing on the pain. See a movie, read a book, listen to music, or spend time with a friend. · If you think you are depressed, talk to your doctor about treatment. · Keep a daily pain diary. Record how your moods, thoughts, sleep patterns, activities, and medicine affect your pain. You may find that your pain is worse during or after certain activities or when you are feeling a certain emotion. Having a record of your pain can help you and your doctor find the best ways to treat your pain. · Take pain medicines exactly as directed. ¨ If the doctor gave you a prescription medicine for pain, take it as prescribed. ¨ If you are not taking a prescription pain medicine, ask your doctor if you can take an over-the-counter medicine. Reducing constipation caused by pain medicine  · Include fruits, vegetables, beans, and whole grains in your diet each day. These foods are high in fiber. · Drink plenty of fluids, enough so that your urine is light yellow or clear like water.  If you have kidney, heart, or liver disease and have to limit fluids, talk with your doctor before you increase the amount of fluids you drink. · If your doctor recommends it, get more exercise. Walking is a good choice. Bit by bit, increase the amount you walk every day. Try for at least 30 minutes on most days of the week. · Schedule time each day for a bowel movement. A daily routine may help. Take your time and do not strain when having a bowel movement. When should you call for help? Call your doctor now or seek immediate medical care if:  · Your pain gets worse or is out of control. · You feel down or blue, or you do not enjoy things like you once did. You may be depressed, which is common in people with chronic pain. Depression can be treated. · You have vomiting or cramps for more than 2 hours. Watch closely for changes in your health, and be sure to contact your doctor if:  · You cannot sleep because of pain. · You are very worried or anxious about your pain. · You have trouble taking your pain medicine. · You have any concerns about your pain medicine. · You have trouble with bowel movements, such as:  ¨ No bowel movement in 3 days. ¨ Blood in the anal area, in your stool, or on the toilet paper. ¨ Diarrhea for more than 24 hours. Where can you learn more? Go to http://charlie-nikhil.info/. Enter N004 in the search box to learn more about \"Chronic Pain: Care Instructions. \"  Current as of: February 19, 2016  Content Version: 11.1  © 7986-8540 [employer]. Care instructions adapted under license by [email or web address] (which disclaims liability or warranty for this information). If you have questions about a medical condition or this instruction, always ask your healthcare professional. [address] any warranty or liability for your use of this information.

## 2019-11-04 NOTE — IP AVS SNAPSHOT
Current Discharge Medication List  
  
Notice You have not been prescribed any medications. Billing Type: Third-Party Bill